# Patient Record
Sex: FEMALE | NOT HISPANIC OR LATINO | URBAN - METROPOLITAN AREA
[De-identification: names, ages, dates, MRNs, and addresses within clinical notes are randomized per-mention and may not be internally consistent; named-entity substitution may affect disease eponyms.]

---

## 2023-07-06 ENCOUNTER — OFFICE VISIT (OUTPATIENT)
Dept: URBAN - METROPOLITAN AREA CLINIC 24 | Facility: CLINIC | Age: 63
End: 2023-07-06
Payer: COMMERCIAL

## 2023-07-06 DIAGNOSIS — H16.143 PUNCTATE KERATITIS, BILATERAL: Primary | ICD-10-CM

## 2023-07-06 DIAGNOSIS — H25.813 COMBINED FORMS OF AGE-RELATED CATARACT, BILATERAL: ICD-10-CM

## 2023-07-06 DIAGNOSIS — H52.03 HYPERMETROPIA, BILATERAL: ICD-10-CM

## 2023-07-06 DIAGNOSIS — Z83.511 FAMILY HISTORY OF GLAUCOMA: ICD-10-CM

## 2023-07-06 PROCEDURE — 92133 CPTRZD OPH DX IMG PST SGM ON: CPT | Performed by: OPTOMETRIST

## 2023-07-06 PROCEDURE — 92004 COMPRE OPH EXAM NEW PT 1/>: CPT | Performed by: OPTOMETRIST

## 2023-07-06 ASSESSMENT — KERATOMETRY
OS: 43.75
OD: 43.64

## 2023-07-06 ASSESSMENT — INTRAOCULAR PRESSURE
OS: 18
OD: 21

## 2023-07-06 ASSESSMENT — VISUAL ACUITY
OD: 20/25
OS: 20/25

## 2023-07-06 NOTE — IMPRESSION/PLAN
Impression: Combined forms of age-related cataract, bilateral: H25.813. Plan: Early. Pt edu. Monitor.

## 2023-07-06 NOTE — IMPRESSION/PLAN
Impression: Hypermetropia, bilateral: H52.03. Plan: Recommend multifocal SRx for FTW. Thorough explained implications of uncorrected hyperopia (and presbyopia) to pt.

## 2023-07-06 NOTE — IMPRESSION/PLAN
Impression: Family history of glaucoma: Z83.511.
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 Good IOP, healthy perfused disc with normal rnfl oct. Pt reassured. Monitor risk annually.

## 2023-07-06 NOTE — IMPRESSION/PLAN
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 Plan: STOP rubbing eyes. Start afts, olopatadine as directed.